# Patient Record
Sex: FEMALE | Race: WHITE | HISPANIC OR LATINO | Employment: UNEMPLOYED | URBAN - METROPOLITAN AREA
[De-identification: names, ages, dates, MRNs, and addresses within clinical notes are randomized per-mention and may not be internally consistent; named-entity substitution may affect disease eponyms.]

---

## 2017-01-27 NOTE — PATIENT DISCUSSION
Continue: PreserVision AREDS 2 (vit c,g-hi-hxbao-lutein-zeaxan): capsule: 298-445-12-8 mg-unit-mg-mg

## 2017-01-27 NOTE — PATIENT DISCUSSION
OHTN, OU:  STABLE INTRAOCULAR PRESSURE WITHOUT OPTIC NERVE CHANGES. RECOMMEND GONIOSCOPY AND VISUAL FIELD EVALUATION WITH CLOSE OBSERVATION. RETURN FOR FOLLOW-UP AS SCHEDULED.

## 2017-01-27 NOTE — PATIENT DISCUSSION
CATARACTS, OU: VISUALLY SIGNIFICANT. SURGERY INDICATED. PATIENT WISHES TO WAIT AT THIS TIME. GLASSES PRESCRIPTION GIVEN. PATIENT TO CALL BACK IF THEY DECIDE TO PROCEED WITH SURGERY WITHIN 90 DAYS. OTHERWISE, FOLLOW AS SCHEDULED.

## 2017-01-27 NOTE — PATIENT DISCUSSION
CHOROIDAL NEVUS, OD: PRESCRIBED UV PROTECTION TO MINIMIZE UV EXPOSURE. RETURN FOR FOLLOW-UP AS SCHEDULED.

## 2017-01-27 NOTE — PATIENT DISCUSSION
OHTN Counseling:  I have explained to the patient at length the diagnosis of ocular hypertension and its pathophysiology. The presence of elevated intraocular pressure without detectable visual field loss and/or optic nerve damage was discussed with the patient. I have discussed the risks, benefits, and alternatives of treatment as well as the risk factors for glaucoma. The patient understands and agrees with close observation. Return  for follow-up as scheduled.

## 2017-01-27 NOTE — PATIENT DISCUSSION
Cataract Surgery Versus Waiting Counseling: I have discussed the option of scheduling cataract surgery versus following, as well as the risks, benefits and alternatives of surgery with the patient. It was explained that the surgery is optional; there is no rush and no harm in waiting to have surgery. It was also explained that there is no guarantee that removing the cataract will improve their vision. The patient understands and wishes to wait at this time. Patient will call back if they decide to proceed with cataract surgery within the next 90 days.

## 2021-01-13 NOTE — PATIENT DISCUSSION
OHTN, OU:  HISTORY OF HIGH IOP. STABLE INTRAOCULAR PRESSURE WITHOUT OPTIC NERVE CHANGES. RECOMMEND GONIOSCOPY WITH CLOSE OBSERVATION. RETURN FOR FOLLOW-UP AS SCHEDULED.

## 2021-01-13 NOTE — PATIENT DISCUSSION
Anesthesia Evaluation     Patient summary reviewed and Nursing notes reviewed                Airway   Mallampati: I  TM distance: >3 FB  Neck ROM: full  No difficulty expected  Dental - normal exam     Pulmonary - negative pulmonary ROS and normal exam   Cardiovascular - normal exam    (+) hypertension, past MI , CAD, CABG, dysrhythmias Atrial Fib, hyperlipidemia,  carotid artery disease      Neuro/Psych- negative ROS  GI/Hepatic/Renal/Endo    (+) obesity, morbid obesity,  diabetes mellitus,     Musculoskeletal     Abdominal  - normal exam    Bowel sounds: normal.   Substance History - negative use     OB/GYN negative ob/gyn ROS         Other   arthritis,    history of cancer                    Anesthesia Plan    ASA 4     MAC       Anesthetic plan, all risks, benefits, and alternatives have been provided, discussed and informed consent has been obtained with: patient.       Medications:

## 2021-01-13 NOTE — PATIENT DISCUSSION
MACULAR DRUSEN, OS: PRESCRIBE AREDS 2 VITAMINS AND INCREASE UV PROTECTION. STRESS GOOD DIET AND NO SMOKING. RETURN FOR FOLLOW-UP AS SCHEDULED.

## 2021-01-13 NOTE — PATIENT DISCUSSION
Continue: PreserVision AREDS 2 (vit c,f-hc-sgwnm-lutein-zeaxan): capsule: [id number] mg-unit-mg-mg

## 2021-07-13 NOTE — PATIENT DISCUSSION
Explained that because she had a macular hole that will limit her ability to obtain great vision. She is currently stuck at 20/50 and that is more than likely the best we can correct her too.

## 2021-07-13 NOTE — PATIENT DISCUSSION
She sees Dr. Nisreen Freed annually. Advised that someone see her every 6 months to ensure nothing happens to the right eye.

## 2021-08-16 NOTE — PATIENT DISCUSSION
OHTN, OU: HISTORY OF HIGH IOP. STABLE INTRAOCULAR PRESSURE WITHOUT OPTIC NERVE CHANGES. GONIOSCOPY DONE TODAY SHOWS SHALLOWING. RECOMMEND THE NEXT AVAILABLE HVF AS A BASELINE. CONTINUE WITH CLOSE OBSERVATION. RETURN FOR FOLLOW-UP AS SCHEDULED.

## 2021-08-16 NOTE — PATIENT DISCUSSION
Continue: PreserVision AREDS 2 (vit c,p-ec-rjgbs-lutein-zeaxan): capsule: [id number] mg-unit-mg-mg 1 capsule once a day by mouth

## 2021-12-10 NOTE — PATIENT DISCUSSION
Baseline HVF today possible early sup/joshua step OD, but poor reliability and not c/w ONH and most recent OCT. WNL OS. No treatment indicated at this time. If OCT at scheduled visit 01/24/22 shows progressive thinning c/w HVF will consider initiating tx. Follow-up as directed. Monitor.

## 2022-01-11 NOTE — PATIENT DISCUSSION
Patient to continue checking [employer]. OCT testing reviewed with patient today. Recommended observation.

## 2022-01-11 NOTE — PATIENT DISCUSSION
She sees Dr. Jorden Dean annually. Advised that someone see her every 6 months to ensure nothing happens to the right eye.

## 2022-01-24 NOTE — PATIENT DISCUSSION
Counseled regarding the nature of findings, and rationale for continuing treatment. Risk of blindness with glaucoma discussed. Stressed compliance with treatment and follow up.

## 2022-04-27 ENCOUNTER — NEW PATIENT (OUTPATIENT)
Dept: URBAN - METROPOLITAN AREA CLINIC 35 | Facility: CLINIC | Age: 67
End: 2022-04-27

## 2022-04-27 DIAGNOSIS — H43.813: ICD-10-CM

## 2022-04-27 DIAGNOSIS — H52.03: ICD-10-CM

## 2022-04-27 PROCEDURE — 92310-2 LEVEL 2 CONTACT LENS MANAGEMENT

## 2022-04-27 PROCEDURE — 92004 COMPRE OPH EXAM NEW PT 1/>: CPT

## 2022-04-27 PROCEDURE — 92015 DETERMINE REFRACTIVE STATE: CPT

## 2022-04-27 ASSESSMENT — VISUAL ACUITY
OS_CC: 20/25-2
OD_CC: J1
OD_SC: 20/80-1
OU_CC: 20/25-2
OS_SC: 20/60-2
OU_CC: J1
OU_SC: 20/50
OD_CC: 20/25-1
OS_CC: J2

## 2022-04-27 ASSESSMENT — TONOMETRY
OD_IOP_MMHG: 18
OS_IOP_MMHG: 18

## 2023-04-28 ENCOUNTER — COMPREHENSIVE EXAM (OUTPATIENT)
Dept: URBAN - METROPOLITAN AREA CLINIC 35 | Facility: CLINIC | Age: 68
End: 2023-04-28

## 2023-04-28 DIAGNOSIS — H25.13: ICD-10-CM

## 2023-04-28 DIAGNOSIS — H52.03: ICD-10-CM

## 2023-04-28 PROCEDURE — 92014 COMPRE OPH EXAM EST PT 1/>: CPT

## 2023-04-28 PROCEDURE — 92015 DETERMINE REFRACTIVE STATE: CPT

## 2023-04-28 ASSESSMENT — TONOMETRY
OS_IOP_MMHG: 17
OD_IOP_MMHG: 18

## 2023-04-28 ASSESSMENT — VISUAL ACUITY
OS_CC: J6
OU_CC: J3
OU_CC: 20/25+2
OD_CC: 20/30
OS_CC: 20/30-
OD_CC: J4

## 2023-05-03 ENCOUNTER — ESTABLISHED PATIENT (OUTPATIENT)
Dept: URBAN - METROPOLITAN AREA CLINIC 35 | Facility: CLINIC | Age: 68
End: 2023-05-03

## 2023-05-03 DIAGNOSIS — H11.121: ICD-10-CM

## 2023-05-03 DIAGNOSIS — D23.111: ICD-10-CM

## 2023-05-03 DIAGNOSIS — H25.13: ICD-10-CM

## 2023-05-03 DIAGNOSIS — H04.123: ICD-10-CM

## 2023-05-03 PROCEDURE — 92285 EXTERNAL OCULAR PHOTOGRAPHY: CPT

## 2023-05-03 PROCEDURE — 65210 REMOVE FOREIGN BODY FROM EYE: CPT

## 2023-05-03 PROCEDURE — 67840 REMOVE EYELID LESION: CPT

## 2023-05-03 PROCEDURE — 99213 OFFICE O/P EST LOW 20 MIN: CPT

## 2023-05-03 RX ORDER — ERYTHROMYCIN 5 MG/G
OINTMENT OPHTHALMIC
End: 2023-05-17

## 2023-05-03 ASSESSMENT — VISUAL ACUITY
OD_CC: 20/30
OS_CC: 20/30

## 2024-10-24 NOTE — PATIENT DISCUSSION
POSTERIOR VITREOUS DETACHMENT WITH VITREOUS FLOATERS, OU: NO HOLES OR NO TEARS 360' WITH INDIRECT EXAM, OU. F&amp;F PRECAUTIONS REVIEWED WITH THE PATIENT. RETURN  AS SCHEDULED. normal...

## 2025-04-02 ENCOUNTER — COMPREHENSIVE EXAM (OUTPATIENT)
Age: 70
End: 2025-04-02

## 2025-04-02 DIAGNOSIS — H25.13: ICD-10-CM

## 2025-04-02 DIAGNOSIS — D23.111: ICD-10-CM

## 2025-04-02 DIAGNOSIS — H52.03: ICD-10-CM

## 2025-04-02 DIAGNOSIS — H43.813: ICD-10-CM

## 2025-04-02 DIAGNOSIS — H11.121: ICD-10-CM

## 2025-04-02 DIAGNOSIS — H52.4: ICD-10-CM

## 2025-04-02 DIAGNOSIS — H04.123: ICD-10-CM

## 2025-04-02 PROCEDURE — 92015 DETERMINE REFRACTIVE STATE: CPT

## 2025-04-02 PROCEDURE — 92310-3 LEVEL 3 SOFT LENS UPDATE

## 2025-04-02 PROCEDURE — 92014 COMPRE OPH EXAM EST PT 1/>: CPT
